# Patient Record
Sex: FEMALE | Race: WHITE | NOT HISPANIC OR LATINO | URBAN - METROPOLITAN AREA
[De-identification: names, ages, dates, MRNs, and addresses within clinical notes are randomized per-mention and may not be internally consistent; named-entity substitution may affect disease eponyms.]

---

## 2024-03-05 ENCOUNTER — EMERGENCY (EMERGENCY)
Facility: HOSPITAL | Age: 39
LOS: 1 days | Discharge: STILL ADMITTED OR ADMINISTRATIVE | End: 2024-03-05
Admitting: EMERGENCY MEDICINE
Payer: SELF-PAY

## 2024-03-05 ENCOUNTER — OUTPATIENT (OUTPATIENT)
Dept: OUTPATIENT SERVICES | Facility: HOSPITAL | Age: 39
LOS: 1 days | Discharge: HOME | End: 2024-03-05
Payer: MEDICAID

## 2024-03-05 VITALS
HEART RATE: 105 BPM | RESPIRATION RATE: 16 BRPM | DIASTOLIC BLOOD PRESSURE: 50 MMHG | TEMPERATURE: 99 F | SYSTOLIC BLOOD PRESSURE: 117 MMHG

## 2024-03-05 VITALS
TEMPERATURE: 99 F | DIASTOLIC BLOOD PRESSURE: 71 MMHG | SYSTOLIC BLOOD PRESSURE: 118 MMHG | RESPIRATION RATE: 18 BRPM | HEART RATE: 130 BPM | OXYGEN SATURATION: 100 %

## 2024-03-05 VITALS — TEMPERATURE: 99 F | SYSTOLIC BLOOD PRESSURE: 123 MMHG | HEART RATE: 52 BPM | DIASTOLIC BLOOD PRESSURE: 58 MMHG

## 2024-03-05 DIAGNOSIS — Z83.79 FAMILY HISTORY OF OTHER DISEASES OF THE DIGESTIVE SYSTEM: Chronic | ICD-10-CM

## 2024-03-05 DIAGNOSIS — O26.899 OTHER SPECIFIED PREGNANCY RELATED CONDITIONS, UNSPECIFIED TRIMESTER: ICD-10-CM

## 2024-03-05 LAB
APPEARANCE UR: ABNORMAL
BACTERIA # UR AUTO: ABNORMAL /HPF
BILIRUB UR-MCNC: NEGATIVE — SIGNIFICANT CHANGE UP
COLOR SPEC: YELLOW — SIGNIFICANT CHANGE UP
DIFF PNL FLD: ABNORMAL
GLUCOSE UR QL: NEGATIVE MG/DL — SIGNIFICANT CHANGE UP
KETONES UR-MCNC: 40 MG/DL
LEUKOCYTE ESTERASE UR-ACNC: ABNORMAL
NITRITE UR-MCNC: NEGATIVE — SIGNIFICANT CHANGE UP
PH UR: 5.5 — SIGNIFICANT CHANGE UP (ref 5–8)
PROT UR-MCNC: 30 MG/DL
RBC CASTS # UR COMP ASSIST: 1 /HPF — SIGNIFICANT CHANGE UP (ref 0–4)
REVIEW: SIGNIFICANT CHANGE UP
SP GR SPEC: 1.03 — SIGNIFICANT CHANGE UP (ref 1–1.03)
SQUAMOUS # UR AUTO: 62 /HPF — HIGH (ref 0–5)
UROBILINOGEN FLD QL: 1 MG/DL — SIGNIFICANT CHANGE UP (ref 0.2–1)
WBC UR QL: 98 /HPF — HIGH (ref 0–5)

## 2024-03-05 PROCEDURE — L9996: CPT

## 2024-03-05 PROCEDURE — 99221 1ST HOSP IP/OBS SF/LOW 40: CPT

## 2024-03-05 NOTE — ED ADULT TRIAGE NOTE - CHIEF COMPLAINT QUOTE
pt [id number] w preg, ANNE 8/14 sees an OBGYN from [facility] c/o abd pain and vomiting. pt able to tolerate PO mountain dew. spoke with L&D, pt to go upstairs.

## 2024-03-05 NOTE — OB PROVIDER TRIAGE NOTE - ADDITIONAL INSTRUCTIONS
This is a 38 year old  at 16.6 weeks gestational age presents with cramping    no fetal heart rate visualized on sonogram  Pt counseled of plan of care by Dr Pandey and Dr Velásquez  pt requesting to go home  information for Dr Deepika Rizo given to patient to schedule D&E. Instructed to call Dr Rizo in the morning tomorrow to schedule procedure  [phone]  pt instructed to return if fever, chills, heavy vaginal bleeding, cramping or worsening contractions.   pt verbalized understanding of instructions given  discharged home at 1840

## 2024-03-05 NOTE — OB RN TRIAGE NOTE - NS_OBGYNHISTORY_OBGYN_ALL_OB_FT
f 9-5    m 10-2  2017 c/s m 7-7  [facility] c/s 2019 @ 33wks fetal demise no fh

## 2024-03-05 NOTE — OB PROVIDER TRIAGE NOTE - NSOBPROVIDERNOTE_OBGYN_ALL_OB_FT
This is a 38 year old  at 16.6 weeks gestational age presents with cramping    no fetal heart rate visualized on sonogram  Pt counseled of plan of care by Dr Pandey and Dr Velásquez  pt requesting to go home  information for Dr Deepika Rizo given to patient to schedule D&E. Instructed to call Dr Rizo in the morning tomorrow to schedule procedure  [phone]  pt instructed to return if fever, chills, heavy vaginal bleeding, cramping or worsening contractions.   pt verbalized understanding of instructions given  discharged home at 1840 This is a 38 year old  at 16.6 weeks gestational age presents with cramping    no fetal heart rate visualized on sonogram  Pt counseled of plan of care by Dr Pandey and Dr Velásquez  pt requesting to go home  information for Dr Deepika Rizo given to patient to schedule D&E. Instructed to call Dr Rizo in the morning tomorrow to schedule procedure  [phone]  pt instructed to return if fever, chills, heavy vaginal bleeding, cramping or worsening contractions.   pt verbalized understanding of instructions given  discharged home at 1840      37 y/o  at 16.6 weeks EGA c/o abdominal cramping.    Confirmation of no FHM by myself and Dr. Velásquez    Pt counseled re. management options (expectant, induction vs D&E).  pt requested referral for D&E.  Pt referred to Dr Rizo [phone]    Eleuterio Pandey M.D.

## 2024-03-05 NOTE — OB PROVIDER TRIAGE NOTE - NSHPPHYSICALEXAM_GEN_ALL_CORE
Vital Signs Last 24 Hrs  T(C): 37.0 (05 Mar 2024 17:36), Max: 37.1 (05 Mar 2024 15:19)  T(F): 98.6 (05 Mar 2024 17:36), Max: 98.8 (05 Mar 2024 15:19)  HR: 52 (05 Mar 2024 17:36) (52 - 130)  BP: 123/58 (05 Mar 2024 17:36) (117/50 - 123/58)  BP(mean): --  RR: 16 (05 Mar 2024 15:42) (16 - 18)  SpO2: 100% (05 Mar 2024 15:19) (100% - 100%)    A&O x3  CTAB  abdomen: gravid, soft, nontender  TAS: no FH visualized on sonogram  Dr Pandey and Dr Velásquez called to bedside to confirm  no FH visualized by Dr aPndey and Dr Velásquez  NO vaginal bleeding

## 2024-03-05 NOTE — OB PROVIDER TRIAGE NOTE - HISTORY OF PRESENT ILLNESS
This is a 38 year old  at 16.6 weeks gestational age presents with complaints of abdominal cramping since 9 am. denies LOF, VB. Pt reports pain scale 8/10. Does not appreciate fetal movement due to early gestational age.   Denies dysuria, hematuria, foul smell, urinary frequency.  PNC: [facility]    AP course:  - uncomplicated as per patient  - hx of c/s x2  - hx of IUFD at 33 weeks 2019

## 2024-03-06 DIAGNOSIS — O26.892 OTHER SPECIFIED PREGNANCY RELATED CONDITIONS, SECOND TRIMESTER: ICD-10-CM

## 2024-03-06 DIAGNOSIS — O34.219 MATERNAL CARE FOR UNSPECIFIED TYPE SCAR FROM PREVIOUS CESAREAN DELIVERY: ICD-10-CM

## 2024-03-06 DIAGNOSIS — O09.292 SUPERVISION OF PREGNANCY WITH OTHER POOR REPRODUCTIVE OR OBSTETRIC HISTORY, SECOND TRIMESTER: ICD-10-CM

## 2024-03-06 DIAGNOSIS — Z3A.16 16 WEEKS GESTATION OF PREGNANCY: ICD-10-CM

## 2024-03-06 DIAGNOSIS — R10.9 UNSPECIFIED ABDOMINAL PAIN: ICD-10-CM

## 2024-03-06 DIAGNOSIS — O09.522 SUPERVISION OF ELDERLY MULTIGRAVIDA, SECOND TRIMESTER: ICD-10-CM
